# Patient Record
(demographics unavailable — no encounter records)

---

## 2024-10-24 NOTE — HISTORY OF PRESENT ILLNESS
[FreeTextEntry1] : The patient is a 64 year old  man with a history of nephrolithiasis passed spontaneously in 2018 and DMII presenting for evaluation of elevated PSA of 4.64 with 17% free on 5/18/19. Patient does not have prior PSA's with him but states that his PCP draws them annually and that they are usually between 2-4. Patient's brother had prostate cancer treated at [facility] recently. Patient's father may have had prostate cancer (patient unsure).   The patient feels well. No gross hematuria or bone pain. He reports nocturia x0 and daytime voiding q5-6h, although if sometimes has coffee at night-time which causes nocturia x3-4. Patient not on any prostate medications. Patient had a kidney stone in 2018 passed spontaneously. PMH: DMII, HTN, HLD, nephrolithiasis in 2018 passed spontaneously PSH: stomach lesion removed in 2018 (follow-up endoscopy in 08/2019)  10/21 - here for f/u - variable daytime frequency, no urgency and and nocturia 0-1. FOS good. No dysuria or hematuria. has reduced  libido. Notes reduced rigidity - only able to have sex 2/10 times.  PCP gave him Cialis (?dose) which helped/ PSA 6.3. now   my repeat 7 with 13% -  tried Cialis 20mg - better in terms of rigidity but not what he hopes for.   10/2/24 - Returns after 14 months,   Most recent prostate biopsy in Aug 2023 - remains on AS for Grade 1 prostate cancer.  He informs he was unsure of when to return and that his PCP has been following his PSA's. Last PSA done in PCP office in June 2024 was "around 11".  Denies LUTs/nocturia.   10/23/24 - Returns s/p repeat of PSA - he thought it was "around" "11" in June of 2024.  Our repeat PSA on 10/2/24 - 10.60 free 11% Is now c/o ED and seeking a daily dose as opposed to needing it before sex. States erections are not as firm as he would like. Last MRI was Summer 2023 along with biopsy

## 2024-10-24 NOTE — HISTORY OF PRESENT ILLNESS
[FreeTextEntry1] : The patient is a 64 year old  man with a history of nephrolithiasis passed spontaneously in 2018 and DMII presenting for evaluation of elevated PSA of 4.64 with 17% free on 5/18/19. Patient does not have prior PSA's with him but states that his PCP draws them annually and that they are usually between 2-4. Patient's brother had prostate cancer treated at [facility] recently. Patient's father may have had prostate cancer (patient unsure).   The patient feels well. No gross hematuria or bone pain. He reports nocturia x0 and daytime voiding q5-6h, although if sometimes has coffee at night-time which causes nocturia x3-4. Patient not on any prostate medications. Patient had a kidney stone in 2018 passed spontaneously. PMH: DMII, HTN, HLD, nephrolithiasis in 2018 passed spontaneously PSH: stomach lesion removed in 2018 (follow-up endoscopy in 08/2019)  10/21 - here for f/u - variable daytime frequency, no urgency and and nocturia 0-1. FOS good. No dysuria or hematuria. has reduced  libido. Notes reduced rigidity - only able to have sex 2/10 times.  PCP gave him Cialis (?dose) which helped/ PSA 6.3. now   my repeat 7 with 13% -  tried Cialis 20mg - better in terms of rigidity but not what he hopes for.   10/2/24 - Returns after 14 months,   Most recent prostate biopsy in Aug 2023 - remains on AS for Grade 1 prostate cancer.  He informs he was unsure of when to return and that his PCP has been following his PSA's. Last PSA done in PCP office in June 2024 was "around 11".  Denies LUTs/nocturia.   10/23/24 - Returns s/p repeat of PSA - he thought it was "around" "11" in June of 2024.  Our repeat PSA on 10/2/24 - 10.60 free 11% Is now c/o ED and seeking a daily dose as opposed to needing it before sex. States erections are not as firm as he would like. Last MRI was Summer 2023 along with biopsy

## 2025-06-03 NOTE — HISTORY OF PRESENT ILLNESS
[FreeTextEntry1] : The patient is a 64 year old  man with a history of nephrolithiasis passed spontaneously in 2018 and DMII presenting for evaluation of elevated PSA of 4.64 with 17% free on 5/18/19. Patient does not have prior PSA's with him but states that his PCP draws them annually and that they are usually between 2-4. Patient's brother had prostate cancer treated at [facility] recently. Patient's father may have had prostate cancer (patient unsure).   The patient feels well. No gross hematuria or bone pain. He reports nocturia x0 and daytime voiding q5-6h, although if sometimes has coffee at night-time which causes nocturia x3-4. Patient not on any prostate medications. Patient had a kidney stone in 2018 passed spontaneously. PMH: DMII, HTN, HLD, nephrolithiasis in 2018 passed spontaneously PSH: stomach lesion removed in 2018 (follow-up endoscopy in 08/2019)  10/21 - here for f/u - variable daytime frequency, no urgency and and nocturia 0-1. FOS good. No dysuria or hematuria. has reduced  libido. Notes reduced rigidity - only able to have sex 2/10 times.  PCP gave him Cialis (?dose) which helped/ PSA 6.3. now   my repeat 7 with 13% -  tried Cialis 20mg - better in terms of rigidity but not what he hopes for.   10/2/24 - Returns after 14 months,   Most recent prostate biopsy in Aug 2023 - remains on AS for Grade 1 prostate cancer.  He informs he was unsure of when to return and that his PCP has been following his PSA's. Last PSA done in PCP office in June 2024 was "around 11".  Denies LUTs/nocturia.   6/25  as before no voiding issues - PSA 14 from 10.6